# Patient Record
Sex: FEMALE | Race: BLACK OR AFRICAN AMERICAN
[De-identification: names, ages, dates, MRNs, and addresses within clinical notes are randomized per-mention and may not be internally consistent; named-entity substitution may affect disease eponyms.]

---

## 2020-05-10 ENCOUNTER — NURSE TRIAGE (OUTPATIENT)
Dept: OTHER | Facility: CLINIC | Age: 60
End: 2020-05-10

## 2020-10-23 ENCOUNTER — NURSE TRIAGE (OUTPATIENT)
Dept: OTHER | Facility: CLINIC | Age: 60
End: 2020-10-23

## 2020-10-23 NOTE — TELEPHONE ENCOUNTER
Patient called in on the [facility] line c/o fatigue, intermittent blurry vision, a bad tooth [on antibiotics] and feels dizzy, onset of symptoms, see triage assessment below. Care Advice provided; patient acknowledged understanding of care advice and is in agreement with plan. Patient has no further questions; instructed to call back for any new or worsening symptoms. Soft transferred patient to [address] [place] [address] Drive line for in-network ER. Reason for Disposition   Patient sounds very sick or weak to the triager    Answer Assessment - Initial Assessment Questions  1. DESCRIPTION: \"Describe your dizziness. \"      Off balance    2. LIGHTHEADED: \"Do you feel lightheaded? \" (e.g., somewhat faint, woozy, weak upon standing)      Slight lightheaded and woozy feeling    3. VERTIGO: \"Do you feel like either you or the room is spinning or tilting? \" (i.e. vertigo)      Denies vertigo    4. SEVERITY: \"How bad is it? \"  \"Do you feel like you are going to faint? \" \"Can you stand and walk? \"    - MILD - walking normally    - MODERATE - interferes with normal activities (e.g., work, school)     - SEVERE - unable to stand, requires support to walk, feels like passing out now. Moderate    5. ONSET:  \"When did the dizziness begin? \"     10/21/20    6. AGGRAVATING FACTORS: \"Does anything make it worse? \" (e.g., standing, change in head position)      Standing and change in head position    7. HEART RATE: \"Can you tell me your heart rate? \" \"How many beats in 15 seconds? \"  (Note: not all patients can do this)        1625 Left wrist BP -  168/93;HR - 66    8. CAUSE: \"What do you think is causing the dizziness? \"      I don't know; PCP has been monitoring her BP, not on meds    9. RECURRENT SYMPTOM: \"Have you had dizziness before? \" If so, ask: \"When was the last time? \" \"What happened that time? \"      Denies recurrent symptoms    10. OTHER SYMPTOMS: \"Do you have any other symptoms? \" (e.g., fever, chest pain, vomiting, diarrhea, bleeding) Intermittent blurred vision, generalized weakness/fatigue, left arm aching 4/10, headache 4/10; right hip pain 5/10 [states painful to touch]    11. PREGNANCY: \"Is there any chance you are pregnant? \" \"When was your last menstrual period? \"        NA    Protocols used: NIXCOKPXO-FOMXR-KG

## 2021-07-07 ENCOUNTER — NURSE TRIAGE (OUTPATIENT)
Dept: OTHER | Facility: CLINIC | Age: 61
End: 2021-07-07

## 2021-07-07 NOTE — TELEPHONE ENCOUNTER
Reason for Disposition   [1] MODERATE weakness (i.e., interferes with work, school, normal activities) AND [2] cause unknown  (Exceptions: weakness with acute minor illness, or weakness from poor fluid intake)    Answer Assessment - Initial Assessment Questions  1. DESCRIPTION: \"Describe how you are feeling. \"      Tired, sleepy, fatigue    2. SEVERITY: \"How bad is it? \"  \"Can you stand and walk? \"    - MILD - Feels weak or tired, but does not interfere with work, school or normal activities    - [facility] to stand and walk; weakness interferes with work, school, or normal activities    - SEVERE - Unable to stand or walk    Moderate    3. ONSET:  \"When did the weakness begin? \"      6/26/21    4. CAUSE: \"What do you think is causing the weakness? \"      Patient reports hx of Fibromyalgia and thinks this could be related    5. MEDICINES:  Mellisa you recently started a new medicine or had a change in the amount of a medicine? \"      Denies    6. OTHER SYMPTOMS: \"Do you have any other symptoms? \" (e.g., chest pain, fever, cough, SOB, vomiting, diarrhea, bleeding, other areas of pain)      Bilateral hip pain that patient reports as sore to the touch. 7. PREGNANCY: \"Is there any chance you are pregnant? \" \"When was your last menstrual period? \"      n/a    Protocols used: WEAKNESS (GENERALIZED) AND FATIGUE-ADULT-AH    Brief description of triage: See above note    Triage indicates for patient to: Edith Art attempted to get in with her PCP but they told her they had no appointments until September. Caller encouraged to visit local UC and assisted caller to find UC within network. Care advice provided, patient verbalizes understanding; denies any other questions or concerns; instructed to call back for any new or worsening symptoms. This triage is a result of a call to [address] [place] [address]. Please do not respond to the triage nurse through this encounter.  Any subsequent communication should be directly with the patient.

## 2022-07-11 ENCOUNTER — NURSE TRIAGE (OUTPATIENT)
Dept: OTHER | Facility: CLINIC | Age: 62
End: 2022-07-11

## 2022-07-11 NOTE — TELEPHONE ENCOUNTER
Subjective: Caller states \"has a productive cough with green mucous, really sore throat, sneezing. Covid test negative yesterday\"     Current Symptoms:     Onset: 3 days ago; worsening    Associated Symptoms: NA    Pain Severity: 9/10; hot, scratchy; constant    Temperature: denies fever    What has been tried: Ibuprofen,Aleve,Tylenol,decongestants    LMP: NA Pregnant: NA    Recommended disposition: See in Office Today    Care advice provided, patient verbalizes understanding; denies any other questions or concerns; instructed to call back for any new or worsening symptoms. Patient/caller agrees to follow-up with PCP     This triage is a result of a call to [address] [place] [address]. Please do not respond to the triage nurse through this encounter. Any subsequent communication should be directly with the patient.       Reason for Disposition   SEVERE sore throat pain    Protocols used: SORE THROAT-ADULT-[place]

## 2022-11-03 ENCOUNTER — NURSE TRIAGE (OUTPATIENT)
Dept: OTHER | Facility: CLINIC | Age: 62
End: 2022-11-03

## 2022-11-03 NOTE — TELEPHONE ENCOUNTER
Location of patient: [place]     Subjective: Caller states \"feeling winded for last couple of weeks. The CT scan was done on the 1st. I am scheduled to see the doctor on Tuesday. I want treatment today. \"      Current Symptoms:   +racing heart when I do a whole lot -no symptoms today   +shortness of breath with activity- over 1 month   +sweating more with exertion   +dizziness \"over the last 30 days\"   +was on steroids in the last 30 days for cold and sinus - 10/20/22 (tele health visit)     Onset: 1 month ago; worsening    Associated Symptoms: NA    Pain Severity: Denies     Temperature: Denies      What has been tried: N/A     LMP: NA Pregnant: NA    Recommended disposition: See HCP within 4 Hours (or PCP triage)    Advised pt to follow up in [facility] or ED if unable to get apt in next 4 hours. Transferred pt to [facility] office per request.     Care advice provided, patient verbalizes understanding; denies any other questions or concerns; instructed to call back for any new or worsening symptoms. Pt will follow up with oncology- go to [facility]/ ED if unable to get apt     This triage is a result of a call to [address] [place] [address]. Please do not respond to the triage nurse through this encounter. Any subsequent communication should be directly with the patient.       Reason for Disposition   [1] MILD difficulty breathing (e.g., minimal/no SOB at rest, SOB with walking, pulse <100) AND [2] NEW-onset or WORSE than normal    Protocols used: Breathing Difficulty-ADULT-AH